# Patient Record
Sex: MALE | Race: OTHER | NOT HISPANIC OR LATINO | URBAN - METROPOLITAN AREA
[De-identification: names, ages, dates, MRNs, and addresses within clinical notes are randomized per-mention and may not be internally consistent; named-entity substitution may affect disease eponyms.]

---

## 2020-11-02 ENCOUNTER — INPATIENT (INPATIENT)
Facility: HOSPITAL | Age: 40
LOS: 2 days | Discharge: HOME | End: 2020-11-05
Attending: HOSPITALIST | Admitting: HOSPITALIST
Payer: MEDICAID

## 2020-11-02 VITALS
DIASTOLIC BLOOD PRESSURE: 72 MMHG | HEART RATE: 80 BPM | SYSTOLIC BLOOD PRESSURE: 126 MMHG | TEMPERATURE: 99 F | RESPIRATION RATE: 17 BRPM | OXYGEN SATURATION: 100 %

## 2020-11-02 LAB
ALBUMIN SERPL ELPH-MCNC: 4.2 G/DL — SIGNIFICANT CHANGE UP (ref 3.5–5.2)
ALP SERPL-CCNC: 117 U/L — HIGH (ref 30–115)
ALT FLD-CCNC: 19 U/L — SIGNIFICANT CHANGE UP (ref 0–41)
ANION GAP SERPL CALC-SCNC: 12 MMOL/L — SIGNIFICANT CHANGE UP (ref 7–14)
AST SERPL-CCNC: 18 U/L — SIGNIFICANT CHANGE UP (ref 0–41)
BASOPHILS # BLD AUTO: 0.03 K/UL — SIGNIFICANT CHANGE UP (ref 0–0.2)
BASOPHILS NFR BLD AUTO: 0.3 % — SIGNIFICANT CHANGE UP (ref 0–1)
BILIRUB SERPL-MCNC: 1.7 MG/DL — HIGH (ref 0.2–1.2)
BUN SERPL-MCNC: 13 MG/DL — SIGNIFICANT CHANGE UP (ref 10–20)
CALCIUM SERPL-MCNC: 9.3 MG/DL — SIGNIFICANT CHANGE UP (ref 8.5–10.1)
CHLORIDE SERPL-SCNC: 103 MMOL/L — SIGNIFICANT CHANGE UP (ref 98–110)
CO2 SERPL-SCNC: 25 MMOL/L — SIGNIFICANT CHANGE UP (ref 17–32)
CREAT SERPL-MCNC: 1.3 MG/DL — SIGNIFICANT CHANGE UP (ref 0.7–1.5)
EOSINOPHIL # BLD AUTO: 0.19 K/UL — SIGNIFICANT CHANGE UP (ref 0–0.7)
EOSINOPHIL NFR BLD AUTO: 1.6 % — SIGNIFICANT CHANGE UP (ref 0–8)
GLUCOSE SERPL-MCNC: 93 MG/DL — SIGNIFICANT CHANGE UP (ref 70–99)
HCT VFR BLD CALC: 40 % — LOW (ref 42–52)
HGB BLD-MCNC: 13.2 G/DL — LOW (ref 14–18)
IMM GRANULOCYTES NFR BLD AUTO: 0.3 % — SIGNIFICANT CHANGE UP (ref 0.1–0.3)
LACTATE SERPL-SCNC: 0.9 MMOL/L — SIGNIFICANT CHANGE UP (ref 0.7–2)
LYMPHOCYTES # BLD AUTO: 1.71 K/UL — SIGNIFICANT CHANGE UP (ref 1.2–3.4)
LYMPHOCYTES # BLD AUTO: 14.5 % — LOW (ref 20.5–51.1)
MCHC RBC-ENTMCNC: 31.1 PG — HIGH (ref 27–31)
MCHC RBC-ENTMCNC: 33 G/DL — SIGNIFICANT CHANGE UP (ref 32–37)
MCV RBC AUTO: 94.3 FL — HIGH (ref 80–94)
MONOCYTES # BLD AUTO: 1.24 K/UL — HIGH (ref 0.1–0.6)
MONOCYTES NFR BLD AUTO: 10.5 % — HIGH (ref 1.7–9.3)
NEUTROPHILS # BLD AUTO: 8.6 K/UL — HIGH (ref 1.4–6.5)
NEUTROPHILS NFR BLD AUTO: 72.8 % — SIGNIFICANT CHANGE UP (ref 42.2–75.2)
NRBC # BLD: 0 /100 WBCS — SIGNIFICANT CHANGE UP (ref 0–0)
PLATELET # BLD AUTO: 385 K/UL — SIGNIFICANT CHANGE UP (ref 130–400)
POTASSIUM SERPL-MCNC: 4.2 MMOL/L — SIGNIFICANT CHANGE UP (ref 3.5–5)
POTASSIUM SERPL-SCNC: 4.2 MMOL/L — SIGNIFICANT CHANGE UP (ref 3.5–5)
PROT SERPL-MCNC: 7.1 G/DL — SIGNIFICANT CHANGE UP (ref 6–8)
RBC # BLD: 4.24 M/UL — LOW (ref 4.7–6.1)
RBC # FLD: 12.3 % — SIGNIFICANT CHANGE UP (ref 11.5–14.5)
SODIUM SERPL-SCNC: 140 MMOL/L — SIGNIFICANT CHANGE UP (ref 135–146)
WBC # BLD: 11.81 K/UL — HIGH (ref 4.8–10.8)
WBC # FLD AUTO: 11.81 K/UL — HIGH (ref 4.8–10.8)

## 2020-11-02 PROCEDURE — 70487 CT MAXILLOFACIAL W/DYE: CPT | Mod: 26

## 2020-11-02 PROCEDURE — 99285 EMERGENCY DEPT VISIT HI MDM: CPT

## 2020-11-02 PROCEDURE — 99223 1ST HOSP IP/OBS HIGH 75: CPT

## 2020-11-02 RX ORDER — AMPICILLIN SODIUM AND SULBACTAM SODIUM 250; 125 MG/ML; MG/ML
3 INJECTION, POWDER, FOR SUSPENSION INTRAMUSCULAR; INTRAVENOUS EVERY 6 HOURS
Refills: 0 | Status: DISCONTINUED | OUTPATIENT
Start: 2020-11-03 | End: 2020-11-05

## 2020-11-02 RX ORDER — ACETAMINOPHEN 500 MG
650 TABLET ORAL EVERY 6 HOURS
Refills: 0 | Status: DISCONTINUED | OUTPATIENT
Start: 2020-11-02 | End: 2020-11-03

## 2020-11-02 RX ORDER — ENOXAPARIN SODIUM 100 MG/ML
40 INJECTION SUBCUTANEOUS DAILY
Refills: 0 | Status: DISCONTINUED | OUTPATIENT
Start: 2020-11-02 | End: 2020-11-05

## 2020-11-02 RX ORDER — AMPICILLIN SODIUM AND SULBACTAM SODIUM 250; 125 MG/ML; MG/ML
INJECTION, POWDER, FOR SUSPENSION INTRAMUSCULAR; INTRAVENOUS
Refills: 0 | Status: DISCONTINUED | OUTPATIENT
Start: 2020-11-02 | End: 2020-11-05

## 2020-11-02 RX ORDER — PREGABALIN 225 MG/1
1000 CAPSULE ORAL DAILY
Refills: 0 | Status: DISCONTINUED | OUTPATIENT
Start: 2020-11-02 | End: 2020-11-05

## 2020-11-02 RX ORDER — INFLUENZA VIRUS VACCINE 15; 15; 15; 15 UG/.5ML; UG/.5ML; UG/.5ML; UG/.5ML
0.5 SUSPENSION INTRAMUSCULAR ONCE
Refills: 0 | Status: DISCONTINUED | OUTPATIENT
Start: 2020-11-02 | End: 2020-11-05

## 2020-11-02 RX ORDER — CHLORHEXIDINE GLUCONATE 213 G/1000ML
1 SOLUTION TOPICAL ONCE
Refills: 0 | Status: COMPLETED | OUTPATIENT
Start: 2020-11-02 | End: 2020-11-02

## 2020-11-02 RX ORDER — AMPICILLIN SODIUM AND SULBACTAM SODIUM 250; 125 MG/ML; MG/ML
3 INJECTION, POWDER, FOR SUSPENSION INTRAMUSCULAR; INTRAVENOUS ONCE
Refills: 0 | Status: COMPLETED | OUTPATIENT
Start: 2020-11-02 | End: 2020-11-02

## 2020-11-02 RX ORDER — KETOROLAC TROMETHAMINE 30 MG/ML
30 SYRINGE (ML) INJECTION ONCE
Refills: 0 | Status: DISCONTINUED | OUTPATIENT
Start: 2020-11-02 | End: 2020-11-02

## 2020-11-02 RX ORDER — SODIUM CHLORIDE 9 MG/ML
1000 INJECTION, SOLUTION INTRAVENOUS ONCE
Refills: 0 | Status: COMPLETED | OUTPATIENT
Start: 2020-11-02 | End: 2020-11-02

## 2020-11-02 RX ORDER — OXYCODONE AND ACETAMINOPHEN 5; 325 MG/1; MG/1
1 TABLET ORAL EVERY 6 HOURS
Refills: 0 | Status: DISCONTINUED | OUTPATIENT
Start: 2020-11-02 | End: 2020-11-05

## 2020-11-02 RX ORDER — PANTOPRAZOLE SODIUM 20 MG/1
40 TABLET, DELAYED RELEASE ORAL
Refills: 0 | Status: DISCONTINUED | OUTPATIENT
Start: 2020-11-02 | End: 2020-11-05

## 2020-11-02 RX ORDER — FOLIC ACID 0.8 MG
1 TABLET ORAL DAILY
Refills: 0 | Status: DISCONTINUED | OUTPATIENT
Start: 2020-11-02 | End: 2020-11-05

## 2020-11-02 RX ADMIN — SODIUM CHLORIDE 1000 MILLILITER(S): 9 INJECTION, SOLUTION INTRAVENOUS at 15:02

## 2020-11-02 RX ADMIN — OXYCODONE AND ACETAMINOPHEN 1 TABLET(S): 5; 325 TABLET ORAL at 22:01

## 2020-11-02 RX ADMIN — Medication 650 MILLIGRAM(S): at 22:00

## 2020-11-02 RX ADMIN — AMPICILLIN SODIUM AND SULBACTAM SODIUM 200 GRAM(S): 250; 125 INJECTION, POWDER, FOR SUSPENSION INTRAMUSCULAR; INTRAVENOUS at 17:32

## 2020-11-02 RX ADMIN — AMPICILLIN SODIUM AND SULBACTAM SODIUM 200 GRAM(S): 250; 125 INJECTION, POWDER, FOR SUSPENSION INTRAMUSCULAR; INTRAVENOUS at 22:13

## 2020-11-02 RX ADMIN — Medication 30 MILLIGRAM(S): at 15:02

## 2020-11-02 NOTE — H&P ADULT - NSHPREVIEWOFSYSTEMS_GEN_ALL_CORE
CONSTITUTIONAL: No weakness, fevers or chills, no weight loss   EYES/ENT: No visual changes;  No vertigo or throat pain   NECK: No pain or stiffness  RESPIRATORY: No cough, wheezing, hemoptysis; No shortness of breath  CARDIOVASCULAR: No chest pain or palpitations  GASTROINTESTINAL: No abdominal or epigastric pain. No nausea, vomiting, or hematemesis; No diarrhea or constipation. No melena or hematochezia.  GENITOURINARY: No dysuria, frequency or hematuria  NEUROLOGICAL: No numbness or weakness  All other review of systems is negative unless indicated above. CONSTITUTIONAL: No weakness, fevers or chills, no weight loss   EYES/ENT: As above  NECK: No pain or stiffness  RESPIRATORY: No cough, wheezing, hemoptysis; No shortness of breath  CARDIOVASCULAR: No chest pain or palpitations  GASTROINTESTINAL: No abdominal or epigastric pain. No nausea, vomiting or hematemesis; No diarrhea or constipation. No melena or hematochezia.  GENITOURINARY: No dysuria, frequency or hematuria  NEUROLOGICAL: No numbness or weakness  All other review of systems is negative unless indicated above.

## 2020-11-02 NOTE — H&P ADULT - ASSESSMENT
40 year old male with no significant past medical history presents with left sided facial pain.    Patient likely has 40 year old male with no significant past medical history presents with left sided facial pain.    Patient likely has a dental abscess from already present dental caries and the trauma from impacted bone during eating. Will continue with Unasyn 3g q6 and follow with dental for further workup.    # Left sided dental Abscess  # H/O of Dental Caries  - Pain, subjective fevers with chills for 2 weeks   - white count 12k, afebrile, hemodynamically stable  - CT shows a 3x2 cm collection possibly an abscess(prelim read)  - s/p oral clindamycin for one week  - Unasyn 3g q6 for now  - Percocet for severe and Tylenol for mild pain  - Follow up with Dental regarding drainage  - Soft Diet for now    DVT: Lovenox  GI: Pantoprazole  Activity: Ambulate as tolerated  Dispo: Acute for now   40 year old male with no significant past medical history presents with left sided facial pain.    Patient likely has a dental abscess from already present dental caries and the trauma from impacted bone during eating. Will continue with Unasyn 3g q6 and follow with dental for further workup.    # Left sided Facial pain secondary to likely dental Abscess  # H/O of Dental Caries  - Pain, subjective fevers with chills for 2 weeks   - white count 12k, afebrile, hemodynamically stable  - CT shows a 3x2 cm collection possibly an abscess(prelim read)  - s/p oral clindamycin for one week  - Unasyn 3g q6 for now  - Percocet for severe and Tylenol for mild pain  - Follow up with Dental regarding drainage  - Soft Diet for now    DVT: Lovenox  GI: Pantoprazole  Activity: Ambulate as tolerated  Dispo: Acute for now   40 year old male with no significant past medical history presents with left sided facial pain.    Patient likely has a dental abscess from already present dental caries and the trauma from impacted bone during eating. Will continue with Unasyn 3g q6 and follow with dental for further workup.    # Left sided Facial pain secondary to likely dental Abscess  # H/O of Dental Caries  - Pain, subjective fevers with chills for 2 weeks   - white count 12k, afebrile, hemodynamically stable  - CT shows a 3x2 cm collection possibly an abscess(prelim read)  - s/p oral clindamycin for one week  - Unasyn 3g q6 for now  - Percocet for severe and Tylenol for mild pain  - Follow up with Dental regarding drainage  - Soft Diet for now    # Alcohol Use  - Counselled on reducing the consumption of alcohol    # Active Smoker  - Refused Nicotine patch  - Counselled on cessation    DVT: Lovenox  GI: Pantoprazole  Activity: Ambulate as tolerated  Dispo: Acute for now   40 year old male with no significant past medical history presents with left sided facial pain.    Patient likely has a dental abscess from already present dental caries and the trauma from impacted bone during eating. Will continue with Unasyn 3g q6 and follow with dental for further workup.    # Left sided Facial pain secondary to likely dental Abscess  # H/O of Dental Caries  - Pain, subjective fevers with chills for 2 weeks   - white count 12k, afebrile, hemodynamically stable  - CT shows a 3x2 cm collection possibly an abscess(prelim read)  - s/p oral clindamycin for one week  - Unasyn 3g q6 for now  - Percocet for severe and Tylenol for mild pain  - Follow up with Dental regarding drainage  - Soft Diet for now    # Alcohol Use  - Counselled on reducing the consumption of alcohol    # Active Smoker  - Refused Nicotine patch  - Counselled on cessation    # Macrocytic Anemia likely alcohol induced  - Will supplement b12 and folate    DVT: Lovenox  GI: Pantoprazole  Activity: Ambulate as tolerated  Dispo: Acute for now

## 2020-11-02 NOTE — ED ADULT TRIAGE NOTE - CHIEF COMPLAINT QUOTE
Pt complaining of fever and swelling to face and neck. was being treated for dental infection that did not resolve. As per note from dentist, infection has spread. sent to ED for IV antibiotics

## 2020-11-02 NOTE — ED PROVIDER NOTE - PHYSICAL EXAMINATION
CONST: Pt appears uncomfortable  EYES: Sclera and conjunctiva clear.   ENT: Oropharynx poor dentition with erythema to L inferior molars. Swelling to L mandible. Uvula midline. No nasal discharge.   NECK: Non-tender, no meningeal signs. normal ROM. supple   CARD: S1 S2; No jvd  RESP: Equal BS B/L, No wheezes, rhonchi or rales. No distress  GI: Soft, non-tender, non-distended. normal BS  MS: Normal ROM in all extremities. pulses 2 +. no calf tenderness or swelling  SKIN: Warm, dry, no acute rashes. Good turgor  NEURO: A&Ox3, No focal deficits. Strength 5/5 with no sensory deficits

## 2020-11-02 NOTE — ED ADULT NURSE NOTE - NSIMPLEMENTINTERV_GEN_ALL_ED
Implemented All Universal Safety Interventions:  [place] to call system. Call bell, personal items and telephone within reach. Instruct patient to call for assistance. Room bathroom lighting operational. Non-slip footwear when patient is off stretcher. Physically safe environment: no spills, clutter or unnecessary equipment. Stretcher in lowest position, wheels locked, appropriate side rails in place.

## 2020-11-02 NOTE — ED PROVIDER NOTE - CLINICAL SUMMARY MEDICAL DECISION MAKING FREE TEXT BOX
41yo M presents for dental pain. Has been on ABx for at least 4 days, no improvement. In ED, no fever, mild leukocytosis, CT showing collection poss abscess. Admitted to IV ABx/ failure of outpatient ABx. Dental to see pt as inpatient. no concerns

## 2020-11-02 NOTE — H&P ADULT - ATTENDING COMMENTS
I saw and evaluated the patient on 11/2/2020. I have reviewed and agree with the findings and plan of care as documented above in the resident’s note (unless indicated differently below). Any necessary changes were made in the body of the text.    CC: facial pain and swelling    HPI:  41 yo M pt w/ no relevant PMHx p/w left sided facial pain and swelling. Onset: 2 weeks ago. Precipitating factor: bone injured teeth and gums as he was eating. Course: persistent pain after this injury. Quality: sharp. Location: left jaw. Radiation: left face/upper neck. Intensity: moderate. Associated symptoms: subjective fevers/chills. Alleviating factors: analgesics; did not improve w/ clindamycin which was prescribed by his dentist. Aggravating factors: none. Coming in now for further evaluation.     ROS:  Constitutional: +subjective fevers and chills  Eyes: no conjunctivitis; no itching  ENT: +dysphagia; +left sided facial pain and swelling  CVS: no orthopnea; no chest pain  Resp: no SOB; no coughing  GI: no nausea; no vomiting; no diarrhea; no abd pain  : no dysuria; no hematuria  MSK: no myalgias; no arthralgias  Skin: no rashes; no ulcers  Neuro: no focal weakness; no headache  All other systems reviewed and are negative    PMHx, home medications, SurHx, FHx and Social history as above in the corresponding sections of the note - reviewed and edited where appropriate    Exam:  Vitals: BP = 162/87; P = 60; T = 99.6; RR = 18; SpO2 > 95 on room air  General: appears stated age; cooperative  Eyes: anicteric sclera; moist conjunctiva; PERRL; EOMI  HENT: NC/AT; +trismus; clear oropharynx; MMM; +left sided facial and neck swelling; +left submandibular collection; nL hard/soft palate  Neck: supple w/ FROM; trachea midline; no thyromegaly; no carotid bruits  Lungs: cta b/l with no tachypnea, accessory muscle usage, wheezing, rhonci or rales  CVS: RRR; S1 and S2 w/o MRGs  Abd: BS+; soft; non-tender to palpation x 4; no masses or HSM  Ext: no peripheral edema; pulses 2+ b/l  Skin: normal temp, turgor and texture; no rashes, ulcers or nodules  Neuro: CN II-XII intact; str 5/5 throughout; sensation grossly intact – light touch/temp  Psych: appropriate affect; alert and oriented to person, place, time and situation    Labs reviewed - significant for WBC 11.81, BUN/Cr 13/1.3, Tbili 1.7, alk phos 117  Imaging reviewed - left submandibular collection 3.4 x 2.2 cm w/ adjacent lymphadenopathy, platysmal thickening & fat stranding; impacted left molar  EKG requested    Assessment:  (1) Left submandibular soft tissue infection:  --- Likely odonotogenic & w/ a possible developing abscess  (2) Possible mild acute renal failure  (3) Active tobacco use - cessation strongly advised  (4) Mildly abnormal LFT's possibly Gilbert's syndrome  (5) Borderline macrocytosis on blood work - r/o B12, folate deficiency    Plan:  (1) Pain control  (2) Dental evaluation: possible drainage?  (3) Abx: agree w/ amp-sulbactam 3g q6hrly  (4) Smoking cessation strongly advised  (5) Daily BUN/Cr; check U/A  (6) Can repeat LFT's; check B12 and folate  (7) Dispo: anticipate d/c in 48 hrs  --- Once evidence of clinical improvement, can switch to amox-clav 875 bid  --- To complete 7-14 days of abx therapy  --- Can d/c plan at that time on PO abx w/ close out-pt f/u    Full code

## 2020-11-02 NOTE — ED PROVIDER NOTE - ATTENDING CONTRIBUTION TO CARE
39yo M with no sig PMHx presents for eval of dental pain. Pt states 2 weeks ago when a bone injured his tooth/gums when eating. Saw dentist 1 week ago, started on ABx, however pain continuing to worsen with swelling in the area, radiates to side of face, worse with talking or opening jaw. Sent in by dentist Dr. Martinez today for further eval. Denies fever/chills, headache, lightheadedness, visual changes, dysphagia, voice changes/ hoarseness, CP, SOB, cough, nausea, vomiting, abd pain.    Vital signs reviewed  GENERAL: Patient nontoxic appearing, NAD  HEAD: NCAT  EYES: Anicteric  ENT: MMM. Gross decay tooth #18. Gingival erythema. Left facial swelling. No submandibular swelling.  RESPIRATORY: Normal respiratory effort. CTA B/L. No wheezing, rales, rhonchi  CARDIOVASCULAR: Regular rate and rhythm  ABDOMEN: Soft. Nondistended. Nontender.   MUSCULOSKELETAL/EXTREMITIES: Brisk cap refill. Equal radial pulses.   SKIN:  Warm and dry  NEURO: AAOx3. No gross FND.

## 2020-11-02 NOTE — H&P ADULT - NSHPPHYSICALEXAM_GEN_ALL_CORE
GENERAL: NAD, lying in bed comfortably  Face:  CHEST/LUNG: Clear to auscultation bilaterally; No rales, rhonchi, wheezing, or rubs. Unlabored respirations  HEART: Regular rate and rhythm;  ABDOMEN: Bowel sounds present; Soft, Nontender, Nondistended. No hepatomegaly  EXTREMITIES:  No peripheral edema  NERVOUS SYSTEM:  Alert & Oriented X3, speech clear. No deficits   MSK: FROM all 4 extremities, full and equal strength GENERAL: NAD, lying in bed comfortably  Face: Left side maxillofacial angle swelling and pain.  CHEST/LUNG: Clear to auscultation bilaterally; No rales, rhonchi, wheezing, or rubs. Unlabored respirations  HEART: Regular rate and rhythm;  ABDOMEN: Bowel sounds present; Soft, Nontender, Nondistended. No hepatomegaly  EXTREMITIES:  No peripheral edema  NERVOUS SYSTEM:  Alert & Oriented X3, speech clear. No deficits   MSK: FROM all 4 extremities, full and equal strength

## 2020-11-02 NOTE — H&P ADULT - NSHPLABSRESULTS_GEN_ALL_CORE
13.2   11.81 )-----------( 385      ( 02 Nov 2020 15:24 )             40.0       11-02    140  |  103  |  13  ----------------------------<  93  4.2   |  25  |  1.3    Ca    9.3      02 Nov 2020 15:24    TPro  7.1  /  Alb  4.2  /  TBili  1.7<H>  /  DBili  x   /  AST  18  /  ALT  19  /  AlkPhos  117<H>  11-02                            CAPILLARY BLOOD GLUCOSE

## 2020-11-02 NOTE — H&P ADULT - HISTORY OF PRESENT ILLNESS
40 year old male with no significant past medical history presents with left sided facial pain.    Patient reports that he was in usual state of health 2 weeks ago when he developed left sided dental pain. Patient was prescribed clindamycin with little intial improvement but worsened over time again. Patient presented to ED due to non improvement in symptoms.    In ED patient hemodynamically stable, afebrile, elevated white count of 11.81, CT scan showing 3.4x2.2cm collection in the maxillofacial area. Dental consulted and will follow on the patient. 40 year old male with no significant past medical history presents with left sided facial pain.    Patient reports that he was in usual state of health 2 weeks ago when he developed left sided dental pain sharp after a bone injured his teeth and gum while eating. Patient had occasional intermittent bleeding and left sided dental pain. Patient reports associated subjective fevers and chills. He saw his dentist and was prescribed clindamycin one week ago with little initial improvement but worsened over time again. He reports sharp 7/10 pain in the left jaw with radiation to left side of face worse with movement of jaw including eating, talking or opening mouth, slightly improved with rest. Patient presented to ED due to non improvement in symptoms despite treatment.    In ED patient hemodynamically stable, afebrile, elevated white count of 11.81, CT scan showing 3.4x2.2cm collection in the maxillofacial area. Dental consulted and will follow on the patient. 40 year old male with no significant past medical history presents with left sided facial pain.    Patient reports that he was in usual state of health 2 weeks ago when he developed left sided dental pain sharp after a bone injured his teeth and gum while eating. Patient had occasional intermittent bleeding and left sided dental pain. Patient reports associated subjective fevers and chills. He saw his dentist and was prescribed clindamycin one week ago with little initial improvement but worsened over time again. He reports sharp 7/10 pain in the left jaw with radiation to left side of face worse with movement of jaw including eating, talking or opening mouth, slightly improved with rest. Patient presented to ED due to non improvement in symptoms despite treatment.    In ED patient hemodynamically stable, afebrile, elevated white count of 11.81, CT scan showing 3.4x2.2cm collection in the maxillofacial area. Dental consulted and will follow on the patient. Unasyn one dose given.

## 2020-11-02 NOTE — ED ADULT NURSE NOTE - OBJECTIVE STATEMENT
left side facial pain and swelling for few days. Pt being tx with Abx for dental pain and swelling, states it is getting worse

## 2020-11-02 NOTE — ED PROVIDER NOTE - OBJECTIVE STATEMENT
40y M no ppmh presents for eval of dental pain. Pt has moderate/severe aching L inferior dental pain x2wks, no improvement with clindamycin, aggravated by eating. Associated swelling and fevers. Denies ha, cp, sob, weakness, numbness, n/v/d/c

## 2020-11-02 NOTE — ED PROVIDER NOTE - NS ED ROS FT
Constitutional: (-) fever  Eyes/ENT: (+) dental pain, (-) blurry vision, (-) epistaxis  Cardiovascular: (-) chest pain, (-) syncope  Respiratory: (-) cough, (-) shortness of breath  Gastrointestinal: (-) vomiting, (-) diarrhea  : (-) dysuria, (-) hematuria  Musculoskeletal: (-) neck pain, (-) back pain, (-) joint pain  Integumentary: (+) edema, (-) rash  Neurological: (-) headache, (-) altered mental status  Allergic/Immunologic: (-) pruritus

## 2020-11-03 LAB
A1C WITH ESTIMATED AVERAGE GLUCOSE RESULT: 5.4 % — SIGNIFICANT CHANGE UP (ref 4–5.6)
ALBUMIN SERPL ELPH-MCNC: 3.8 G/DL — SIGNIFICANT CHANGE UP (ref 3.5–5.2)
ALP SERPL-CCNC: 107 U/L — SIGNIFICANT CHANGE UP (ref 30–115)
ALT FLD-CCNC: 14 U/L — SIGNIFICANT CHANGE UP (ref 0–41)
ANION GAP SERPL CALC-SCNC: 13 MMOL/L — SIGNIFICANT CHANGE UP (ref 7–14)
APTT BLD: 33.6 SEC — SIGNIFICANT CHANGE UP (ref 27–39.2)
AST SERPL-CCNC: 14 U/L — SIGNIFICANT CHANGE UP (ref 0–41)
BASOPHILS # BLD AUTO: 0.06 K/UL — SIGNIFICANT CHANGE UP (ref 0–0.2)
BASOPHILS NFR BLD AUTO: 0.5 % — SIGNIFICANT CHANGE UP (ref 0–1)
BILIRUB SERPL-MCNC: 1.7 MG/DL — HIGH (ref 0.2–1.2)
BUN SERPL-MCNC: 13 MG/DL — SIGNIFICANT CHANGE UP (ref 10–20)
CALCIUM SERPL-MCNC: 8.6 MG/DL — SIGNIFICANT CHANGE UP (ref 8.5–10.1)
CHLORIDE SERPL-SCNC: 104 MMOL/L — SIGNIFICANT CHANGE UP (ref 98–110)
CHOLEST SERPL-MCNC: 242 MG/DL — HIGH
CO2 SERPL-SCNC: 23 MMOL/L — SIGNIFICANT CHANGE UP (ref 17–32)
CREAT SERPL-MCNC: 1 MG/DL — SIGNIFICANT CHANGE UP (ref 0.7–1.5)
EOSINOPHIL # BLD AUTO: 0.2 K/UL — SIGNIFICANT CHANGE UP (ref 0–0.7)
EOSINOPHIL NFR BLD AUTO: 1.5 % — SIGNIFICANT CHANGE UP (ref 0–8)
ESTIMATED AVERAGE GLUCOSE: 108 MG/DL — SIGNIFICANT CHANGE UP (ref 68–114)
GLUCOSE SERPL-MCNC: 80 MG/DL — SIGNIFICANT CHANGE UP (ref 70–99)
HCT VFR BLD CALC: 39.7 % — LOW (ref 42–52)
HDLC SERPL-MCNC: 35 MG/DL — LOW
HGB BLD-MCNC: 13.1 G/DL — LOW (ref 14–18)
IMM GRANULOCYTES NFR BLD AUTO: 0.5 % — HIGH (ref 0.1–0.3)
INR BLD: 1.31 RATIO — HIGH (ref 0.65–1.3)
LIPID PNL WITH DIRECT LDL SERPL: 177 MG/DL — HIGH
LYMPHOCYTES # BLD AUTO: 1.59 K/UL — SIGNIFICANT CHANGE UP (ref 1.2–3.4)
LYMPHOCYTES # BLD AUTO: 12.2 % — LOW (ref 20.5–51.1)
MAGNESIUM SERPL-MCNC: 2.1 MG/DL — SIGNIFICANT CHANGE UP (ref 1.8–2.4)
MCHC RBC-ENTMCNC: 31.1 PG — HIGH (ref 27–31)
MCHC RBC-ENTMCNC: 33 G/DL — SIGNIFICANT CHANGE UP (ref 32–37)
MCV RBC AUTO: 94.3 FL — HIGH (ref 80–94)
MONOCYTES # BLD AUTO: 1.16 K/UL — HIGH (ref 0.1–0.6)
MONOCYTES NFR BLD AUTO: 8.9 % — SIGNIFICANT CHANGE UP (ref 1.7–9.3)
NEUTROPHILS # BLD AUTO: 9.97 K/UL — HIGH (ref 1.4–6.5)
NEUTROPHILS NFR BLD AUTO: 76.4 % — HIGH (ref 42.2–75.2)
NON HDL CHOLESTEROL: 207 MG/DL — HIGH
NRBC # BLD: 0 /100 WBCS — SIGNIFICANT CHANGE UP (ref 0–0)
PHOSPHATE SERPL-MCNC: 3.7 MG/DL — SIGNIFICANT CHANGE UP (ref 2.1–4.9)
PLATELET # BLD AUTO: 375 K/UL — SIGNIFICANT CHANGE UP (ref 130–400)
POTASSIUM SERPL-MCNC: 3.9 MMOL/L — SIGNIFICANT CHANGE UP (ref 3.5–5)
POTASSIUM SERPL-SCNC: 3.9 MMOL/L — SIGNIFICANT CHANGE UP (ref 3.5–5)
PROT SERPL-MCNC: 6.5 G/DL — SIGNIFICANT CHANGE UP (ref 6–8)
PROTHROM AB SERPL-ACNC: 15.1 SEC — HIGH (ref 9.95–12.87)
RBC # BLD: 4.21 M/UL — LOW (ref 4.7–6.1)
RBC # FLD: 12.1 % — SIGNIFICANT CHANGE UP (ref 11.5–14.5)
SARS-COV-2 RNA SPEC QL NAA+PROBE: SIGNIFICANT CHANGE UP
SODIUM SERPL-SCNC: 140 MMOL/L — SIGNIFICANT CHANGE UP (ref 135–146)
TRIGL SERPL-MCNC: 139 MG/DL — SIGNIFICANT CHANGE UP
WBC # BLD: 13.05 K/UL — HIGH (ref 4.8–10.8)
WBC # FLD AUTO: 13.05 K/UL — HIGH (ref 4.8–10.8)

## 2020-11-03 PROCEDURE — 99232 SBSQ HOSP IP/OBS MODERATE 35: CPT

## 2020-11-03 RX ORDER — OXYCODONE AND ACETAMINOPHEN 5; 325 MG/1; MG/1
1 TABLET ORAL ONCE
Refills: 0 | Status: DISCONTINUED | OUTPATIENT
Start: 2020-11-03 | End: 2020-11-03

## 2020-11-03 RX ADMIN — PANTOPRAZOLE SODIUM 40 MILLIGRAM(S): 20 TABLET, DELAYED RELEASE ORAL at 06:32

## 2020-11-03 RX ADMIN — ENOXAPARIN SODIUM 40 MILLIGRAM(S): 100 INJECTION SUBCUTANEOUS at 11:16

## 2020-11-03 RX ADMIN — OXYCODONE AND ACETAMINOPHEN 1 TABLET(S): 5; 325 TABLET ORAL at 21:42

## 2020-11-03 RX ADMIN — Medication 1 MILLIGRAM(S): at 11:16

## 2020-11-03 RX ADMIN — AMPICILLIN SODIUM AND SULBACTAM SODIUM 200 GRAM(S): 250; 125 INJECTION, POWDER, FOR SUSPENSION INTRAMUSCULAR; INTRAVENOUS at 11:15

## 2020-11-03 RX ADMIN — OXYCODONE AND ACETAMINOPHEN 1 TABLET(S): 5; 325 TABLET ORAL at 01:42

## 2020-11-03 RX ADMIN — AMPICILLIN SODIUM AND SULBACTAM SODIUM 200 GRAM(S): 250; 125 INJECTION, POWDER, FOR SUSPENSION INTRAMUSCULAR; INTRAVENOUS at 18:22

## 2020-11-03 RX ADMIN — OXYCODONE AND ACETAMINOPHEN 1 TABLET(S): 5; 325 TABLET ORAL at 02:23

## 2020-11-03 RX ADMIN — AMPICILLIN SODIUM AND SULBACTAM SODIUM 200 GRAM(S): 250; 125 INJECTION, POWDER, FOR SUSPENSION INTRAMUSCULAR; INTRAVENOUS at 05:20

## 2020-11-03 RX ADMIN — OXYCODONE AND ACETAMINOPHEN 1 TABLET(S): 5; 325 TABLET ORAL at 00:06

## 2020-11-03 RX ADMIN — Medication 650 MILLIGRAM(S): at 00:06

## 2020-11-03 RX ADMIN — PREGABALIN 1000 MICROGRAM(S): 225 CAPSULE ORAL at 11:16

## 2020-11-03 NOTE — PROGRESS NOTE ADULT - SUBJECTIVE AND OBJECTIVE BOX
SUBJECTIVE:    Patient is a 40y old Male who presents with a chief complaint of Facial Pain (02 Nov 2020 20:22)      HPI:  40 year old male with no significant past medical history presents with left sided facial pain.    Patient reports that he was in usual state of health 2 weeks ago when he developed left sided dental pain sharp after a bone injured his teeth and gum while eating. Patient had occasional intermittent bleeding and left sided dental pain. Patient reports associated subjective fevers and chills. He saw his dentist and was prescribed clindamycin one week ago with little initial improvement but worsened over time again. He reports sharp 7/10 pain in the left jaw with radiation to left side of face worse with movement of jaw including eating, talking or opening mouth, slightly improved with rest. Patient presented to ED due to non improvement in symptoms despite treatment.    In ED patient hemodynamically stable, afebrile, elevated white count of 11.81, CT scan showing 3.4x2.2cm collection in the maxillofacial area. Dental consulted and will follow on the patient. Unasyn one dose given. (02 Nov 2020 20:22)      Currently admitted to medicine with the primary diagnosis of Dental infection    left sided facial pain, no increase in the pain or spreading, pain with swallowing    Besides the pertinent positives and negatives described above, the ROS was within normal limits.    PAST MEDICAL & SURGICAL HISTORY  No pertinent past medical history    No significant past surgical history      SOCIAL HISTORY:    ALLERGIES:  Grass, Dogs, Animals, Pollen (Pruritus)  No Known Drug Allergies    MEDICATIONS:  STANDING MEDICATIONS  ampicillin/sulbactam  IVPB      ampicillin/sulbactam  IVPB 3 Gram(s) IV Intermittent every 6 hours  chlorhexidine 4% Liquid 1 Application(s) Topical once  cyanocobalamin 1000 MICROGram(s) Oral daily  enoxaparin Injectable 40 milliGRAM(s) SubCutaneous daily  folic acid 1 milliGRAM(s) Oral daily  influenza   Vaccine 0.5 milliLiter(s) IntraMuscular once  pantoprazole    Tablet 40 milliGRAM(s) Oral before breakfast    PRN MEDICATIONS  acetaminophen   Tablet .. 650 milliGRAM(s) Oral every 6 hours PRN  oxycodone    5 mG/acetaminophen 325 mG 1 Tablet(s) Oral every 6 hours PRN    VITALS:   T(F): 98.3  HR: 74  BP: 131/86  RR: 18  SpO2: 99%    LABS:                        13.1   13.05 )-----------( 375      ( 03 Nov 2020 07:24 )             39.7     11-03    140  |  104  |  13  ----------------------------<  80  3.9   |  23  |  1.0    Ca    8.6      03 Nov 2020 07:24  Phos  3.7     11-03  Mg     2.1     11-03    TPro  6.5  /  Alb  3.8  /  TBili  1.7<H>  /  DBili  x   /  AST  14  /  ALT  14  /  AlkPhos  107  11-03    PT/INR - ( 03 Nov 2020 07:24 )   PT: 15.10 sec;   INR: 1.31 ratio         PTT - ( 03 Nov 2020 07:24 )  PTT:33.6 sec      Lactate, Blood: 0.9 mmol/L (11-02-20 @ 15:24)          RADIOLOGY:    PHYSICAL EXAM:  GEN: No acute distress  LUNGS: Clear to auscultation bilaterally   HEAD: tenderness in left cheek, submental tenderness left side  HEART: Regular  ABD: Soft, non-tender, non-distended.  EXT: NC/NC/NE/2+PP/WOLF/Skin Intact.   NEURO: AAOX3    Intravenous access: yes  NG tube: no   Brunner Catheter: no

## 2020-11-03 NOTE — PROGRESS NOTE ADULT - ASSESSMENT
40 year old male with no significant past medical history presents with left sided facial pain secondary to dental abscess    # Left sided Facial pain secondary to dental Abscess  - wbc 13, currently afebrile, 100.6 last night  - CT shows a 3x2 cm collection  - Unasyn 3g q6 // f/u dental consult  - Percocet for severe pain  - Soft Diet for now    # Alcohol Use  - Counselled on reducing the consumption of alcohol    # Active Smoker  - Refused Nicotine patch  - Counselled on cessation    # Macrocytic Anemia likely alcohol induced  - Will supplement b12 and folate    PLAN: f/u dental, c/w abx    DVT: Lovenox  GI: Pantoprazole  Activity: Ambulate as tolerated  Dispo: Acute for now

## 2020-11-04 LAB
ALBUMIN SERPL ELPH-MCNC: 3.5 G/DL — SIGNIFICANT CHANGE UP (ref 3.5–5.2)
ALP SERPL-CCNC: 97 U/L — SIGNIFICANT CHANGE UP (ref 30–115)
ALT FLD-CCNC: 13 U/L — SIGNIFICANT CHANGE UP (ref 0–41)
ANION GAP SERPL CALC-SCNC: 11 MMOL/L — SIGNIFICANT CHANGE UP (ref 7–14)
AST SERPL-CCNC: 13 U/L — SIGNIFICANT CHANGE UP (ref 0–41)
BASOPHILS # BLD AUTO: 0.05 K/UL — SIGNIFICANT CHANGE UP (ref 0–0.2)
BASOPHILS NFR BLD AUTO: 0.7 % — SIGNIFICANT CHANGE UP (ref 0–1)
BILIRUB SERPL-MCNC: 1.1 MG/DL — SIGNIFICANT CHANGE UP (ref 0.2–1.2)
BUN SERPL-MCNC: 14 MG/DL — SIGNIFICANT CHANGE UP (ref 10–20)
CALCIUM SERPL-MCNC: 8.8 MG/DL — SIGNIFICANT CHANGE UP (ref 8.5–10.1)
CHLORIDE SERPL-SCNC: 102 MMOL/L — SIGNIFICANT CHANGE UP (ref 98–110)
CO2 SERPL-SCNC: 27 MMOL/L — SIGNIFICANT CHANGE UP (ref 17–32)
CREAT SERPL-MCNC: 1 MG/DL — SIGNIFICANT CHANGE UP (ref 0.7–1.5)
EOSINOPHIL # BLD AUTO: 0.36 K/UL — SIGNIFICANT CHANGE UP (ref 0–0.7)
EOSINOPHIL NFR BLD AUTO: 5.1 % — SIGNIFICANT CHANGE UP (ref 0–8)
GLUCOSE SERPL-MCNC: 81 MG/DL — SIGNIFICANT CHANGE UP (ref 70–99)
HCT VFR BLD CALC: 39.5 % — LOW (ref 42–52)
HGB BLD-MCNC: 13.1 G/DL — LOW (ref 14–18)
IMM GRANULOCYTES NFR BLD AUTO: 0.4 % — HIGH (ref 0.1–0.3)
LYMPHOCYTES # BLD AUTO: 1.9 K/UL — SIGNIFICANT CHANGE UP (ref 1.2–3.4)
LYMPHOCYTES # BLD AUTO: 27.1 % — SIGNIFICANT CHANGE UP (ref 20.5–51.1)
MAGNESIUM SERPL-MCNC: 2.2 MG/DL — SIGNIFICANT CHANGE UP (ref 1.8–2.4)
MCHC RBC-ENTMCNC: 31 PG — SIGNIFICANT CHANGE UP (ref 27–31)
MCHC RBC-ENTMCNC: 33.2 G/DL — SIGNIFICANT CHANGE UP (ref 32–37)
MCV RBC AUTO: 93.4 FL — SIGNIFICANT CHANGE UP (ref 80–94)
MONOCYTES # BLD AUTO: 0.73 K/UL — HIGH (ref 0.1–0.6)
MONOCYTES NFR BLD AUTO: 10.4 % — HIGH (ref 1.7–9.3)
NEUTROPHILS # BLD AUTO: 3.95 K/UL — SIGNIFICANT CHANGE UP (ref 1.4–6.5)
NEUTROPHILS NFR BLD AUTO: 56.3 % — SIGNIFICANT CHANGE UP (ref 42.2–75.2)
NRBC # BLD: 0 /100 WBCS — SIGNIFICANT CHANGE UP (ref 0–0)
PLATELET # BLD AUTO: 421 K/UL — HIGH (ref 130–400)
POTASSIUM SERPL-MCNC: 4.7 MMOL/L — SIGNIFICANT CHANGE UP (ref 3.5–5)
POTASSIUM SERPL-SCNC: 4.7 MMOL/L — SIGNIFICANT CHANGE UP (ref 3.5–5)
PROT SERPL-MCNC: 6.2 G/DL — SIGNIFICANT CHANGE UP (ref 6–8)
RBC # BLD: 4.23 M/UL — LOW (ref 4.7–6.1)
RBC # FLD: 12 % — SIGNIFICANT CHANGE UP (ref 11.5–14.5)
SARS-COV-2 IGG SERPL QL IA: NEGATIVE — SIGNIFICANT CHANGE UP
SARS-COV-2 IGM SERPL IA-ACNC: 0.27 INDEX — SIGNIFICANT CHANGE UP
SODIUM SERPL-SCNC: 140 MMOL/L — SIGNIFICANT CHANGE UP (ref 135–146)
WBC # BLD: 7.02 K/UL — SIGNIFICANT CHANGE UP (ref 4.8–10.8)
WBC # FLD AUTO: 7.02 K/UL — SIGNIFICANT CHANGE UP (ref 4.8–10.8)

## 2020-11-04 PROCEDURE — 99232 SBSQ HOSP IP/OBS MODERATE 35: CPT

## 2020-11-04 RX ORDER — KETOROLAC TROMETHAMINE 30 MG/ML
30 SYRINGE (ML) INJECTION ONCE
Refills: 0 | Status: DISCONTINUED | OUTPATIENT
Start: 2020-11-04 | End: 2020-11-04

## 2020-11-04 RX ADMIN — AMPICILLIN SODIUM AND SULBACTAM SODIUM 200 GRAM(S): 250; 125 INJECTION, POWDER, FOR SUSPENSION INTRAMUSCULAR; INTRAVENOUS at 17:00

## 2020-11-04 RX ADMIN — PREGABALIN 1000 MICROGRAM(S): 225 CAPSULE ORAL at 11:54

## 2020-11-04 RX ADMIN — AMPICILLIN SODIUM AND SULBACTAM SODIUM 200 GRAM(S): 250; 125 INJECTION, POWDER, FOR SUSPENSION INTRAMUSCULAR; INTRAVENOUS at 00:26

## 2020-11-04 RX ADMIN — OXYCODONE AND ACETAMINOPHEN 1 TABLET(S): 5; 325 TABLET ORAL at 21:25

## 2020-11-04 RX ADMIN — AMPICILLIN SODIUM AND SULBACTAM SODIUM 200 GRAM(S): 250; 125 INJECTION, POWDER, FOR SUSPENSION INTRAMUSCULAR; INTRAVENOUS at 12:40

## 2020-11-04 RX ADMIN — AMPICILLIN SODIUM AND SULBACTAM SODIUM 200 GRAM(S): 250; 125 INJECTION, POWDER, FOR SUSPENSION INTRAMUSCULAR; INTRAVENOUS at 05:45

## 2020-11-04 RX ADMIN — OXYCODONE AND ACETAMINOPHEN 1 TABLET(S): 5; 325 TABLET ORAL at 23:39

## 2020-11-04 RX ADMIN — OXYCODONE AND ACETAMINOPHEN 1 TABLET(S): 5; 325 TABLET ORAL at 12:40

## 2020-11-04 RX ADMIN — PANTOPRAZOLE SODIUM 40 MILLIGRAM(S): 20 TABLET, DELAYED RELEASE ORAL at 05:46

## 2020-11-04 RX ADMIN — Medication 30 MILLIGRAM(S): at 12:40

## 2020-11-04 RX ADMIN — AMPICILLIN SODIUM AND SULBACTAM SODIUM 200 GRAM(S): 250; 125 INJECTION, POWDER, FOR SUSPENSION INTRAMUSCULAR; INTRAVENOUS at 23:41

## 2020-11-04 RX ADMIN — OXYCODONE AND ACETAMINOPHEN 1 TABLET(S): 5; 325 TABLET ORAL at 11:54

## 2020-11-04 RX ADMIN — Medication 1 MILLIGRAM(S): at 11:54

## 2020-11-04 RX ADMIN — Medication 30 MILLIGRAM(S): at 11:54

## 2020-11-04 NOTE — PROGRESS NOTE ADULT - ATTENDING COMMENTS
Patient seen and examined s/p tooth extraction by dental. Reports severe pain, no other complaint. Start Toradol for pain. C/w IV abx for now, plan to transition to PO in AM and dc in AM.   Rest of plan as above. Exceptions:  #Hyperlipidemia - lifestyle modifications, smoking and alcohol cessation, ASCVD risk 9.2%, plan to repeat lipid profile as outpatient     #Progress Note Handoff  Pending (specify):  IV abx, pain control   Family discussion: sari pt plan as above   Disposition: Home

## 2020-11-04 NOTE — PROGRESS NOTE ADULT - SUBJECTIVE AND OBJECTIVE BOX
DAILY PROGRESS NOTE  ===========================================================    Patient Information:  RONNY WILLINGHAM  /  40y  /  Male  /  MRN#: [id number]    Hospital Day: 2d     |:::::::::::::::::::::::::::| SUBJECTIVE |:::::::::::::::::::::::::::|    OVERNIGHT EVENTS: None  TODAY: Patient was seen today at bedside. Complains of pain in the jaw. Review of systems is otherwise negative.    |:::::::::::::::::::::::::::| OBJECTIVE |:::::::::::::::::::::::::::|    VITAL SIGNS: Last 24 Hours  T(C): 36.7 (04 Nov 2020 14:36), Max: 37.5 (03 Nov 2020 18:27)  T(F): 98 (04 Nov 2020 14:36), Max: 99.5 (03 Nov 2020 18:27)  HR: 66 (04 Nov 2020 14:36) (60 - 70)  BP: 138/92 (04 Nov 2020 14:36) (113/77 - 138/92)  BP(mean): 89 (03 Nov 2020 22:24) (89 - 89)  RR: 19 (04 Nov 2020 14:36) (18 - 19)  SpO2: 99% (03 Nov 2020 22:24) (99% - 99%)    11-03-20 @ 07:01  -  11-04-20 @ 07:00  --------------------------------------------------------  IN: 772 mL / OUT: 2000 mL / NET: -1228 mL      PHYSICAL EXAM:  GENERAL:   Awake, alert; NAD.  HEENT:  swollen jaw on the left,  Conjunctivae pink, Sclera anicteric; Oral mucosa moist.  CARDIO:   Regular rate; Regular rhythm; S1 & S2.  RESP:   No rales, wheezing, or rhonchi appreciated.  GI:   Soft; NT/ND; BS; No guarding; No rebound tenderness.  EXT:   Stregnth UE 5/5; Strength LE 5/5; No edema.   SKIN:   Intact.    LAB RESULTS:                        13.1   7.02  )-----------( 421      ( 04 Nov 2020 05:18 )             39.5     11-04    140  |  102  |  14  ----------------------------<  81  4.7   |  27  |  1.0    Ca    8.8      04 Nov 2020 05:18  Phos  3.7     11-03  Mg     2.2     11-04    TPro  6.2  /  Alb  3.5  /  TBili  1.1  /  DBili  x   /  AST  13  /  ALT  13  /  AlkPhos  97  11-04    PT/INR - ( 03 Nov 2020 07:24 )   PT: 15.10 sec;   INR: 1.31 ratio         PTT - ( 03 Nov 2020 07:24 )  PTT:33.6 sec            MICROBIOLOGY:    Culture - Blood (collected 02 Nov 2020 15:24)  Source: .Blood Blood  Preliminary Report (03 Nov 2020 22:01):    No growth to date.    Culture - Blood (collected 02 Nov 2020 15:24)  Source: .Blood Blood  Preliminary Report (03 Nov 2020 22:01):    No growth to date.      RADIOLOGY:    ALLERGIES:  Grass, Dogs, Animals, Pollen (Pruritus)  No Known Drug Allergies      ===========================================================

## 2020-11-04 NOTE — CONSULT NOTE ADULT - SUBJECTIVE AND OBJECTIVE BOX
Patient is a 40y old  Male who presents with a chief complaint of Facial Pain (03 Nov 2020 11:54)      HPI:  40 year old male with no significant past medical history presents with left sided facial pain.    Patient reports that he was in usual state of health 2 weeks ago when he developed left sided dental pain sharp after a bone injured his teeth and gum while eating. Patient had occasional intermittent bleeding and left sided dental pain. Patient reports associated subjective fevers and chills. He saw his dentist and was prescribed clindamycin one week ago with little initial improvement but worsened over time again. He reports sharp 7/10 pain in the left jaw with radiation to left side of face worse with movement of jaw including eating, talking or opening mouth, slightly improved with rest. Patient presented to ED due to non improvement in symptoms despite treatment.    In ED patient hemodynamically stable, afebrile, elevated white count of 11.81, CT scan showing 3.4x2.2cm collection in the maxillofacial area. Dental consulted and will follow on the patient. Unasyn one dose given. (02 Nov 2020 20:22)      PAST MEDICAL & SURGICAL HISTORY:  No pertinent past medical history        MEDICATIONS  (STANDING):  ampicillin/sulbactam  IVPB      ampicillin/sulbactam  IVPB 3 Gram(s) IV Intermittent every 6 hours  cyanocobalamin 1000 MICROGram(s) Oral daily  enoxaparin Injectable 40 milliGRAM(s) SubCutaneous daily  folic acid 1 milliGRAM(s) Oral daily  influenza   Vaccine 0.5 milliLiter(s) IntraMuscular once  pantoprazole    Tablet 40 milliGRAM(s) Oral before breakfast    MEDICATIONS  (PRN):  oxycodone    5 mG/acetaminophen 325 mG 1 Tablet(s) Oral every 6 hours PRN Severe Pain (7 - 10)      Allergies    Grass, Dogs, Animals, Pollen (Pruritus)  No Known Drug Allergies          FAMILY HISTORY:  No pertinent family history in first degree relatives      Vital Signs Last 24 Hrs  T(C): 36.3 (04 Nov 2020 04:53), Max: 37.8 (03 Nov 2020 13:35)  T(F): 97.3 (04 Nov 2020 04:53), Max: 100 (03 Nov 2020 13:35)  HR: 60 (04 Nov 2020 04:53) (60 - 74)  BP: 124/88 (04 Nov 2020 04:53) (111/76 - 129/84)  BP(mean): 89 (03 Nov 2020 22:24) (89 - 90)  RR: 18 (04 Nov 2020 04:53) (18 - 18)  SpO2: 99% (03 Nov 2020 22:24) (98% - 99%)    LABS:                        13.1   7.02  )-----------( 421      ( 04 Nov 2020 05:18 )             39.5     11-04    140  |  102  |  14  ----------------------------<  81  4.7   |  27  |  1.0    Ca    8.8      04 Nov 2020 05:18  Phos  3.7     11-03  Mg     2.2     11-04    TPro  6.2  /  Alb  3.5  /  TBili  1.1  /  DBili  x   /  AST  13  /  ALT  13  /  AlkPhos  97  11-04    WBC Count: 7.02 K/uL [4.80 - 10.80] (11-04 @ 05:18)  Platelet Count - Automated: 421 K/uL <H> [130 - 400] (11-04 @ 05:18)  WBC Count: 13.05 K/uL <H> [4.80 - 10.80] (11-03 @ 07:24)  Platelet Count - Automated: 375 K/uL [130 - 400] (11-03 @ 07:24)  INR: 1.31 ratio <H> [0.65 - 1.30] (11-03 @ 07:24)  Platelet Count - Automated: 385 K/uL [130 - 400] (11-02 @ 15:24)  WBC Count: 11.81 K/uL <H> [4.80 - 10.80] (11-02 @ 15:24)  Culture Results:   No growth to date. (11-02 @ 15:24)  Culture Results:   No growth to date. (11-02 @ 15:24)        PT/INR - ( 03 Nov 2020 07:24 )   PT: 15.10 sec;   INR: 1.31 ratio         PTT - ( 03 Nov 2020 07:24 )  PTT:33.6 sec  EOE:  TMJ (  - ) clicks                     ( -  ) pops                     ( -  ) crepitus                       (  + ) trismus             ( +  ) lymphadenopathy             ( +  ) swelling             (  + ) asymmetry             ( +  ) palpation             (  - ) dyspnea             ( -  ) dysphagia             (  - ) loss of consciousness    IOE:            ( +  ) percussion           ( +  ) palpation           ( +  ) swelling            (  - ) abscess           ( -  ) sinus tract          *DENTAL RADIOGRAPHS: panoramic radiograph taken      *ASSESSMENT: #18 grossly decayed with periapical pathology, nonrestorable.          *PLAN: extraction #18    PROCEDURE:    ID: [id number]. Consent obtained. Side site completed. Risks and benefits explained to patient as per OS sheet dated 07/13/00.  Anesthesia: 1 carpule of 2% Lidocaine 1:100K epinephrine, 1 carpule 3% Carbocaine no epinephrine via inferior alveolar nerve block and 2 carpules of 4% Septocaine 1:100K epinephrine via infiltration.   Treatment: #18 elevated and removed with elevators and forceps. Post-operative x-ray taken. Hemostasis achieved. Post-operative instructions given to the patient.       RECOMMENDATIONS:  1) Amoxicillin 500mg 1q8 for 7 days, Ibuprofen 600mg as needed.  2) Dental F/U with outpatient dentist for comprehensive dental care.   3) If any difficulty swallowing/breathing, fever occur, return to ER.     Opal Meza DDS pager #[phone]

## 2020-11-04 NOTE — SBIRT NOTE ADULT - NSSBIRTBRIEFINTDET_GEN_A_CORE
Facilitated patient-centered discussion in motivational interviewing style to raise patient's awareness of harms around excessive alcohol use, NIAAA low-risk drinking level, affirm strengths, elicit change talk, and enhance motivation towards behavioral change.

## 2020-11-04 NOTE — PROGRESS NOTE ADULT - ASSESSMENT
40 year old male with no significant past medical history presents with left sided facial pain secondary to dental abscess    # Left sided Facial pain secondary to dental Abscess  - wbc 13, currently afebrile, 100.6 last night  - CT shows a 3x2 cm collection  -Dental consult - tooth removed 11/4, will reeval pt in the morning for possible  I&D if pt does not improve   -oral abx for now Amoxicillin 500mg 1q8 for 7 day  - Percocet for severe pain  - Mechanical Soft Diet for now    # Alcohol Use  - Counselled on reducing the consumption of alcohol    # Active Smoker  - Refused Nicotine patch  - Counselled on cessation    # Macrocytic Anemia likely alcohol induced  - Will supplement b12 and folate        DVT: Lovenox  GI: Pantoprazole  Activity: Ambulate as tolerated  Dispo: Anticipate for tomorrow if stable.

## 2020-11-05 VITALS
TEMPERATURE: 99 F | SYSTOLIC BLOOD PRESSURE: 139 MMHG | HEART RATE: 65 BPM | RESPIRATION RATE: 18 BRPM | DIASTOLIC BLOOD PRESSURE: 88 MMHG

## 2020-11-05 LAB
ANION GAP SERPL CALC-SCNC: 10 MMOL/L — SIGNIFICANT CHANGE UP (ref 7–14)
BUN SERPL-MCNC: 11 MG/DL — SIGNIFICANT CHANGE UP (ref 10–20)
CALCIUM SERPL-MCNC: 9.4 MG/DL — SIGNIFICANT CHANGE UP (ref 8.5–10.1)
CHLORIDE SERPL-SCNC: 103 MMOL/L — SIGNIFICANT CHANGE UP (ref 98–110)
CO2 SERPL-SCNC: 27 MMOL/L — SIGNIFICANT CHANGE UP (ref 17–32)
CREAT SERPL-MCNC: 1.1 MG/DL — SIGNIFICANT CHANGE UP (ref 0.7–1.5)
GLUCOSE SERPL-MCNC: 92 MG/DL — SIGNIFICANT CHANGE UP (ref 70–99)
HCT VFR BLD CALC: 41.9 % — LOW (ref 42–52)
HGB BLD-MCNC: 13.7 G/DL — LOW (ref 14–18)
MCHC RBC-ENTMCNC: 31 PG — SIGNIFICANT CHANGE UP (ref 27–31)
MCHC RBC-ENTMCNC: 32.7 G/DL — SIGNIFICANT CHANGE UP (ref 32–37)
MCV RBC AUTO: 94.8 FL — HIGH (ref 80–94)
NRBC # BLD: 0 /100 WBCS — SIGNIFICANT CHANGE UP (ref 0–0)
PLATELET # BLD AUTO: 444 K/UL — HIGH (ref 130–400)
POTASSIUM SERPL-MCNC: 4.7 MMOL/L — SIGNIFICANT CHANGE UP (ref 3.5–5)
POTASSIUM SERPL-SCNC: 4.7 MMOL/L — SIGNIFICANT CHANGE UP (ref 3.5–5)
RBC # BLD: 4.42 M/UL — LOW (ref 4.7–6.1)
RBC # FLD: 11.9 % — SIGNIFICANT CHANGE UP (ref 11.5–14.5)
SODIUM SERPL-SCNC: 140 MMOL/L — SIGNIFICANT CHANGE UP (ref 135–146)
WBC # BLD: 6.24 K/UL — SIGNIFICANT CHANGE UP (ref 4.8–10.8)
WBC # FLD AUTO: 6.24 K/UL — SIGNIFICANT CHANGE UP (ref 4.8–10.8)

## 2020-11-05 PROCEDURE — 99239 HOSP IP/OBS DSCHRG MGMT >30: CPT

## 2020-11-05 RX ORDER — IBUPROFEN 200 MG
2 TABLET ORAL
Qty: 40 | Refills: 0
Start: 2020-11-05 | End: 2020-11-14

## 2020-11-05 RX ORDER — AMOXICILLIN 250 MG/5ML
1 SUSPENSION, RECONSTITUTED, ORAL (ML) ORAL
Qty: 21 | Refills: 0
Start: 2020-11-05 | End: 2020-11-11

## 2020-11-05 RX ORDER — AMOXICILLIN 250 MG/5ML
500 SUSPENSION, RECONSTITUTED, ORAL (ML) ORAL EVERY 8 HOURS
Refills: 0 | Status: DISCONTINUED | OUTPATIENT
Start: 2020-11-05 | End: 2020-11-05

## 2020-11-05 RX ADMIN — PANTOPRAZOLE SODIUM 40 MILLIGRAM(S): 20 TABLET, DELAYED RELEASE ORAL at 05:25

## 2020-11-05 RX ADMIN — Medication 500 MILLIGRAM(S): at 11:07

## 2020-11-05 RX ADMIN — AMPICILLIN SODIUM AND SULBACTAM SODIUM 200 GRAM(S): 250; 125 INJECTION, POWDER, FOR SUSPENSION INTRAMUSCULAR; INTRAVENOUS at 05:25

## 2020-11-05 RX ADMIN — PREGABALIN 1000 MICROGRAM(S): 225 CAPSULE ORAL at 11:07

## 2020-11-05 RX ADMIN — Medication 1 MILLIGRAM(S): at 11:07

## 2020-11-05 NOTE — DISCHARGE NOTE PROVIDER - NSDCMRMEDTOKEN_GEN_ALL_CORE_FT
amoxicillin 500 mg oral capsule: 1 cap(s) orally every 8 hours END DATE : 11/12/2020  ibuprofen 400 mg oral tablet: 2 tab(s) orally 2 times a day as needed

## 2020-11-05 NOTE — PROGRESS NOTE ADULT - SUBJECTIVE AND OBJECTIVE BOX
Patient visited bedside morning of 11/5/20. Patient reports that he is feeling better. Patient was alert and oriented. Swelling of the left mandible still present.    Cj Schwartz DDS  Pager #[phone] Patient visited bedside morning of 11/5/20. Patient reports that he is feeling better. Patient was alert and oriented. Swelling of the left mandible still present (as expected) after surgical intervention.    Cj Schwartz DDS  Pager #[phone]

## 2020-11-05 NOTE — DISCHARGE NOTE PROVIDER - NSDCCPCAREPLAN_GEN_ALL_CORE_FT
PRINCIPAL DISCHARGE DIAGNOSIS  Diagnosis: Dental infection  Assessment and Plan of Treatment: You were see by the dental team at the hospital, the affected tooth was removed. There was an abscess seen on CT maxilofacial, per Dental, this should resolve with the tooth removal, there is no need for drainage of the abscess. Please complete your antibiotics regimen as prescribed and follow up with Dental asw an outpt. If there is worsening of symptoms, please contact your PMD or go to the nearest ED.       PRINCIPAL DISCHARGE DIAGNOSIS  Diagnosis: Dental infection  Assessment and Plan of Treatment: You were seen by the dental team at the hospital, the affected tooth was removed. There was an abscess seen on CT maxilofacial, per Dental, this should resolve with the tooth removal, there is no need for drainage of the abscess. Please complete your antibiotics regimen as prescribed and follow up with Dental asw an outpt. If there is worsening of symptoms, please contact your PMD or go to the nearest ED. Please follow up with your dentist as an outpatient.

## 2020-11-05 NOTE — DISCHARGE NOTE PROVIDER - HOSPITAL COURSE
40 year old male with no significant past medical history presents with left sided facial pain.    Patient reports that he was in usual state of health 2 weeks ago when he developed left sided dental pain sharp after a bone injured his teeth and gum while eating. Patient had occasional intermittent bleeding and left sided dental pain. Patient reports associated subjective fevers and chills. He saw his dentist and was prescribed clindamycin one week ago with little initial improvement but worsened over time again. He reports sharp 7/10 pain in the left jaw with radiation to left side of face worse with movement of jaw including eating, talking or opening mouth, slightly improved with rest. Patient presented to ED due to non improvement in symptoms despite treatment.    In ED patient hemodynamically stable, afebrile, elevated white count of 11.81, CT scan showing 3.4x2.2cm collection in the maxillofacial area.  Dental consulted pt, removed affected tooth 11/04/2020. Pt reports that he is feeling better today. Hemodynamically stable to be discharged.    Discharge plans discussed with attending.    40 year old male with no significant past medical history presents with left sided facial pain.    Patient reports that he was in usual state of health 2 weeks ago when he developed left sided dental pain sharp after a bone injured his teeth and gum while eating. Patient had occasional intermittent bleeding and left sided dental pain. Patient reports associated subjective fevers and chills. He saw his dentist and was prescribed clindamycin one week ago with little initial improvement but worsened over time again. He reports sharp 7/10 pain in the left jaw with radiation to left side of face worse with movement of jaw including eating, talking or opening mouth, slightly improved with rest. Patient presented to ED due to non improvement in symptoms despite treatment.    In ED patient hemodynamically stable, afebrile, elevated white count of 11.81, CT scan showing 3.4x2.2cm collection in the maxillofacial area.  Dental consulted pt, removed affected tooth 11/04/2020. Per dental, no need for an incision and drainage. Pt reports that he is feeling better today. Hemodynamically stable to be discharged. Discharged on Amoxicillin 500mg q8h for 7 days and Ibuprofen as needed. Pt to follow up with his dentist as an outpatient.     Pt advised on smoking and alcohol cessation.    Discharge plans discussed with attending.

## 2020-11-05 NOTE — DISCHARGE NOTE NURSING/CASE MANAGEMENT/SOCIAL WORK - PATIENT PORTAL LINK FT
You can access the FollowMyHealth Patient Portal offered by [facility] by registering at the following website: [email or web address]. By joining [employer]’s FollowMyHealth portal, you will also be able to view your health information using other applications (apps) compatible with our system.

## 2020-11-07 LAB
CULTURE RESULTS: SIGNIFICANT CHANGE UP
CULTURE RESULTS: SIGNIFICANT CHANGE UP
SPECIMEN SOURCE: SIGNIFICANT CHANGE UP
SPECIMEN SOURCE: SIGNIFICANT CHANGE UP

## 2020-11-11 DIAGNOSIS — Z71.41 ALCOHOL ABUSE COUNSELING AND SURVEILLANCE OF ALCOHOLIC: ICD-10-CM

## 2020-11-11 DIAGNOSIS — E78.5 HYPERLIPIDEMIA, UNSPECIFIED: ICD-10-CM

## 2020-11-11 DIAGNOSIS — Z71.6 TOBACCO ABUSE COUNSELING: ICD-10-CM

## 2020-11-11 DIAGNOSIS — R94.5 ABNORMAL RESULTS OF LIVER FUNCTION STUDIES: ICD-10-CM

## 2020-11-11 DIAGNOSIS — F17.210 NICOTINE DEPENDENCE, CIGARETTES, UNCOMPLICATED: ICD-10-CM

## 2020-11-11 DIAGNOSIS — K02.9 DENTAL CARIES, UNSPECIFIED: ICD-10-CM

## 2020-11-11 DIAGNOSIS — Z72.89 OTHER PROBLEMS RELATED TO LIFESTYLE: ICD-10-CM

## 2020-11-11 DIAGNOSIS — M27.2 INFLAMMATORY CONDITIONS OF JAWS: ICD-10-CM

## 2020-11-11 DIAGNOSIS — D53.9 NUTRITIONAL ANEMIA, UNSPECIFIED: ICD-10-CM

## 2020-11-11 DIAGNOSIS — K04.7 PERIAPICAL ABSCESS WITHOUT SINUS: ICD-10-CM

## 2021-07-31 ENCOUNTER — EMERGENCY (EMERGENCY)
Facility: HOSPITAL | Age: 41
LOS: 0 days | Discharge: HOME | End: 2021-07-31
Attending: EMERGENCY MEDICINE | Admitting: EMERGENCY MEDICINE
Payer: MEDICAID

## 2021-07-31 VITALS
DIASTOLIC BLOOD PRESSURE: 83 MMHG | OXYGEN SATURATION: 98 % | RESPIRATION RATE: 20 BRPM | TEMPERATURE: 98 F | SYSTOLIC BLOOD PRESSURE: 127 MMHG | HEART RATE: 76 BPM

## 2021-07-31 VITALS — DIASTOLIC BLOOD PRESSURE: 87 MMHG | HEART RATE: 80 BPM | SYSTOLIC BLOOD PRESSURE: 122 MMHG

## 2021-07-31 DIAGNOSIS — F17.200 NICOTINE DEPENDENCE, UNSPECIFIED, UNCOMPLICATED: ICD-10-CM

## 2021-07-31 DIAGNOSIS — Z79.899 OTHER LONG TERM (CURRENT) DRUG THERAPY: ICD-10-CM

## 2021-07-31 DIAGNOSIS — Z91.048 OTHER NONMEDICINAL SUBSTANCE ALLERGY STATUS: ICD-10-CM

## 2021-07-31 DIAGNOSIS — S71.001A UNSPECIFIED OPEN WOUND, RIGHT HIP, INITIAL ENCOUNTER: ICD-10-CM

## 2021-07-31 DIAGNOSIS — Z48.00 ENCOUNTER FOR CHANGE OR REMOVAL OF NONSURGICAL WOUND DRESSING: ICD-10-CM
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
## 2021-07-31 DIAGNOSIS — L53.9 ERYTHEMATOUS CONDITION, UNSPECIFIED: ICD-10-CM

## 2021-07-31 DIAGNOSIS — M79.89 OTHER SPECIFIED SOFT TISSUE DISORDERS: ICD-10-CM

## 2021-07-31 DIAGNOSIS — Z48.817 ENCOUNTER FOR SURGICAL AFTERCARE FOLLOWING SURGERY ON THE SKIN AND SUBCUTANEOUS TISSUE: ICD-10-CM

## 2021-07-31 LAB
ALBUMIN SERPL ELPH-MCNC: 4.5 G/DL — SIGNIFICANT CHANGE UP (ref 3.5–5.2)
ALP SERPL-CCNC: 129 U/L — HIGH (ref 30–115)
ALT FLD-CCNC: 26 U/L — SIGNIFICANT CHANGE UP (ref 0–41)
ANION GAP SERPL CALC-SCNC: 14 MMOL/L — SIGNIFICANT CHANGE UP (ref 7–14)
AST SERPL-CCNC: 24 U/L — SIGNIFICANT CHANGE UP (ref 0–41)
BASOPHILS # BLD AUTO: 0.04 K/UL — SIGNIFICANT CHANGE UP (ref 0–0.2)
BASOPHILS NFR BLD AUTO: 0.6 % — SIGNIFICANT CHANGE UP (ref 0–1)
BILIRUB SERPL-MCNC: 0.3 MG/DL — SIGNIFICANT CHANGE UP (ref 0.2–1.2)
BUN SERPL-MCNC: 17 MG/DL — SIGNIFICANT CHANGE UP (ref 10–20)
CALCIUM SERPL-MCNC: 9.9 MG/DL — SIGNIFICANT CHANGE UP (ref 8.5–10.1)
CHLORIDE SERPL-SCNC: 101 MMOL/L — SIGNIFICANT CHANGE UP (ref 98–110)
CO2 SERPL-SCNC: 23 MMOL/L — SIGNIFICANT CHANGE UP (ref 17–32)
CREAT SERPL-MCNC: 0.9 MG/DL — SIGNIFICANT CHANGE UP (ref 0.7–1.5)
EOSINOPHIL # BLD AUTO: 0.28 K/UL — SIGNIFICANT CHANGE UP (ref 0–0.7)
EOSINOPHIL NFR BLD AUTO: 3.9 % — SIGNIFICANT CHANGE UP (ref 0–8)
ERYTHROCYTE [SEDIMENTATION RATE] IN BLOOD: 43 MM/HR — HIGH (ref 0–10)
GLUCOSE SERPL-MCNC: 75 MG/DL — SIGNIFICANT CHANGE UP (ref 70–99)
HCT VFR BLD CALC: 44.8 % — SIGNIFICANT CHANGE UP (ref 42–52)
HGB BLD-MCNC: 15 G/DL — SIGNIFICANT CHANGE UP (ref 14–18)
IMM GRANULOCYTES NFR BLD AUTO: 0.6 % — HIGH (ref 0.1–0.3)
LACTATE SERPL-SCNC: 1.4 MMOL/L — SIGNIFICANT CHANGE UP (ref 0.7–2)
LYMPHOCYTES # BLD AUTO: 2.71 K/UL — SIGNIFICANT CHANGE UP (ref 1.2–3.4)
LYMPHOCYTES # BLD AUTO: 37.9 % — SIGNIFICANT CHANGE UP (ref 20.5–51.1)
MCHC RBC-ENTMCNC: 31.6 PG — HIGH (ref 27–31)
MCHC RBC-ENTMCNC: 33.5 G/DL — SIGNIFICANT CHANGE UP (ref 32–37)
MCV RBC AUTO: 94.3 FL — HIGH (ref 80–94)
MONOCYTES # BLD AUTO: 0.79 K/UL — HIGH (ref 0.1–0.6)
MONOCYTES NFR BLD AUTO: 11 % — HIGH (ref 1.7–9.3)
NEUTROPHILS # BLD AUTO: 3.29 K/UL — SIGNIFICANT CHANGE UP (ref 1.4–6.5)
NEUTROPHILS NFR BLD AUTO: 46 % — SIGNIFICANT CHANGE UP (ref 42.2–75.2)
NRBC # BLD: 0 /100 WBCS — SIGNIFICANT CHANGE UP (ref 0–0)
PLATELET # BLD AUTO: 355 K/UL — SIGNIFICANT CHANGE UP (ref 130–400)
POTASSIUM SERPL-MCNC: 4.3 MMOL/L — SIGNIFICANT CHANGE UP (ref 3.5–5)
POTASSIUM SERPL-SCNC: 4.3 MMOL/L — SIGNIFICANT CHANGE UP (ref 3.5–5)
PROT SERPL-MCNC: 7.4 G/DL — SIGNIFICANT CHANGE UP (ref 6–8)
RBC # BLD: 4.75 M/UL — SIGNIFICANT CHANGE UP (ref 4.7–6.1)
RBC # FLD: 12.2 % — SIGNIFICANT CHANGE UP (ref 11.5–14.5)
SARS-COV-2 RNA SPEC QL NAA+PROBE: SIGNIFICANT CHANGE UP
SODIUM SERPL-SCNC: 138 MMOL/L — SIGNIFICANT CHANGE UP (ref 135–146)
WBC # BLD: 7.15 K/UL — SIGNIFICANT CHANGE UP (ref 4.8–10.8)
WBC # FLD AUTO: 7.15 K/UL — SIGNIFICANT CHANGE UP (ref 4.8–10.8)

## 2021-07-31 PROCEDURE — 99284 EMERGENCY DEPT VISIT MOD MDM: CPT

## 2021-07-31 PROCEDURE — 73502 X-RAY EXAM HIP UNI 2-3 VIEWS: CPT | Mod: 26,RT

## 2021-07-31 PROCEDURE — 73552 X-RAY EXAM OF FEMUR 2/>: CPT | Mod: 26,RT

## 2021-07-31 PROCEDURE — 99282 EMERGENCY DEPT VISIT SF MDM: CPT

## 2021-07-31 RX ORDER — CEPHALEXIN 500 MG
1 CAPSULE ORAL
Qty: 28 | Refills: 0
Start: 2021-07-31 | End: 2021-08-08

## 2021-07-31 RX ORDER — CEPHALEXIN 500 MG
1 CAPSULE ORAL
Qty: 28 | Refills: 0
Start: 2021-07-31 | End: 2021-08-06

## 2021-07-31 RX ORDER — VANCOMYCIN HCL 1 G
1000 VIAL (EA) INTRAVENOUS ONCE
Refills: 0 | Status: COMPLETED | OUTPATIENT
Start: 2021-07-31 | End: 2021-07-31

## 2021-07-31 RX ORDER — AZTREONAM 2 G
1 VIAL (EA) INJECTION
Qty: 14 | Refills: 0
Start: 2021-07-31 | End: 2021-08-08

## 2021-07-31 RX ORDER — AZTREONAM 2 G
1 VIAL (EA) INJECTION
Qty: 14 | Refills: 0
Start: 2021-07-31 | End: 2021-08-06

## 2021-07-31 RX ADMIN — Medication 250 MILLIGRAM(S): at 21:53

## 2021-07-31 NOTE — CONSULT NOTE ADULT - ASSESSMENT
40 y/o male w/ open wound to right hip and infected follicle to left lateral calf    Plan  Given no fevers, no white count, not immunocompromised, wound is clean, pain is controlled  - pt may dc with Burn clinic f/u outpatient, please call to make appointment  - wound culture taken of right hip f/u results  - PO abx rec  - wound care: soap & water bid, moist packing to right hip bid, dry dressing to left calf  - pt may shower as usual, NO baths, pools, or ocean until wounds heal  - return precautions given

## 2021-07-31 NOTE — ED PROVIDER NOTE - NSFOLLOWUPCLINICS_GEN_ALL_ED_FT
[facility]  Burn  [address], [address]  [place] [address]  Phone: [phone]  Fax:   Follow Up Time: 1-3 Days

## 2021-07-31 NOTE — ED PROVIDER NOTE - NSFOLLOWUPINSTRUCTIONS_ED_ALL_ED_FT
Wound Check  ImageIf you have a wound, it may take some time to heal. Eventually, a scar will form. The scar will also fade with time. It is important to take care of your wound while it is healing. This helps to protect your wound from infection.    How should I take care of my wound at home?  Some wounds are allowed to close on their own or are repaired at a later date. There are many different ways to close and cover a wound, including stitches (sutures), skin glue, and adhesive strips. Follow your health care provider's instructions about:    Wound care.  Bandage (dressing) changes and removal.  Wound closure removal.    Take medicines only as directed by your health care provider.  Keep all follow-up visits as directed by your health care provider. This is important.  Do not take baths, swim, or use a hot tub until your health care provider approves. You may shower as directed by your health care provider.  Keep your wound clean and dry.  What affects scar formation?  Scars affect each person differently. How your body scars depends on:    The location and size of your wound.  Traits that you inherited from your parents (genetic predisposition).  How you take care of your wound. Irritation and inflammation increase the amount of scar formation.  Sun exposure. This can darken a scar.    When should I call or see my health care provider?  Call or see your health care provider if:    You have redness, swelling, or pain at your wound site.  You have fluid, blood, or pus coming from your wound.  You have muscle aches, chills, or a general ill feeling.  You notice a bad smell coming from the wound.  Your wound separates after the sutures, staples, or skin adhesive strips have been removed.  You have persistent nausea or vomiting.  You have a fever.  You are dizzy.    When should I call 911 or go to the emergency room?  Call 911 or go to the emergency room if:    You faint.  You have difficulty breathing.    This information is not intended to replace advice given to you by your health care provider. Make sure you discuss any questions you have with your health care provider.    Abscess    WHAT YOU NEED TO KNOW:    A warm compress may help your abscess drain. Your healthcare provider may make a cut in the abscess so it can drain. You may need surgery to remove an abscess that is on your hands or buttocks.     DISCHARGE INSTRUCTIONS:    Return to the emergency department if:     The area around your abscess becomes very painful, warm, or has red streaks.      You have a fever and chills.      Your heart is beating faster than usual.       You feel faint or confused.    Contact your healthcare provider if:     Your abscess gets bigger or does not get better.      Your abscess returns.      You have questions or concerns about your condition or care.    Medicines: You may need any of the following:     Antibiotics help treat a bacterial infection.       Acetaminophen decreases pain and fever. It is available without a doctor's order. Ask how much to take and how often to take it. Follow directions. Acetaminophen can cause liver damage if not taken correctly.      NSAIDs, such as ibuprofen, help decrease swelling, pain, and fever. This medicine is available with or without a doctor's order. NSAIDs can cause stomach bleeding or kidney problems in certain people. If you take blood thinner medicine, always ask your healthcare provider if NSAIDs are safe for you. Always read the medicine label and follow directions.      Take your medicine as directed. Contact your healthcare provider if you think your medicine is not helping or if you have side effects. Tell him or her if you are allergic to any medicine. Keep a list of the medicines, vitamins, and herbs you take. Include the amounts, and when and why you take them. Bring the list or the pill bottles to follow-up visits. Carry your medicine list with you in case of an emergency.    Self-care:     Apply a warm compress to your abscess. This will help it open and drain. Wet a washcloth in warm, but not hot, water. Apply the compress for 10 minutes. Repeat this 4 times each day. Do not press on an abscess or try to open it with a needle. You may push the bacteria deeper or into your blood.       Do not share your clothes, towels, or sheets with anyone. This can spread the infection to others.       Wash your hands often. This can help prevent the spread of germs. Use soap and water or an alcohol-based hand rub.     Care for your wound after it is drained:     Care for your wound as directed. If your healthcare provider says it is okay, carefully remove the bandage and gauze packing. You may need to soak the gauze to get it out of your wound. Clean your wound and the area around it as directed. Dry the area and put on new, clean bandages. Change your bandages when they get wet or dirty.       Ask your healthcare provider how to change the gauze in your wound. Keep track of how many pieces of gauze are placed inside the wound. Do not put too much packing in the wound. Do not pack the gauze too tightly in your wound.     Follow up with your healthcare provider in 1 to 3 days: You may need to have your packing removed or your bandage changed. Write down your questions so you remember to ask them during your visits.        © Copyright [employer] 2019 All illustrations and images included in CareNotes are the copyrighted property of [employer] or [employer].

## 2021-07-31 NOTE — CONSULT NOTE ADULT - SUBJECTIVE AND OBJECTIVE BOX
42 y/o male denies any pmhx presents to ED w/ right hip wound x 1 week and left lateral calf red bump x 5 days. Patient states his right hip began as a "small red bump" which became larger and ruptured 2 days ago with yellow pus. Patient denies any bites, pruritus, trauma, fever, cough, chills, cp, sob, abd pain, diff ambulating, sick contacts, recent travel.    Burn consulted for wound evaluation.    PAST MEDICAL & SURGICAL HISTORY:  No pertinent past medical history  No significant past surgical history      Allergies  Grass, Dogs, Animals, Pollen (Pruritus)  No Known Drug Allergies    Intolerances      FAMILY HISTORY:  No pertinent family history in first degree relatives    Vital Signs Last 24 Hrs  T(C): 36.9 (31 Jul 2021 20:33), Max: 36.9 (31 Jul 2021 20:33)  T(F): 98.5 (31 Jul 2021 20:33), Max: 98.5 (31 Jul 2021 20:33)  HR: 76 (31 Jul 2021 20:33) (76 - 76)  BP: 127/83 (31 Jul 2021 20:33) (127/83 - 127/83)  BP(mean): --  RR: 20 (31 Jul 2021 20:33) (20 - 20)  SpO2: 98% (31 Jul 2021 20:33) (98% - 98%)        I&O's Detail        LABS:                        15.0   7.15  )-----------( 355      ( 31 Jul 2021 21:18 )             44.8     07-31    138  |  101  |  17  ----------------------------<  75  4.3   |  23  |  0.9    Ca    9.9      31 Jul 2021 21:18    TPro  7.4  /  Alb  4.5  /  TBili  0.3  /  DBili  x   /  AST  24  /  ALT  26  /  AlkPhos  129<H>  07-31        PE:  Gen: NAD, sitting in chair, daughter at side  Skin: Right hip with open wound ~ 2x2.5x1cm w/ mild surrounding erythema, pink wound base w/ small amount of white exudate, no purulence noted, no tunneling, no undermining, no active bleeding, +ttp, _+FROM, pt able to ambulate well  Left lateral calf w/ ~3x2cm area of mild erythema w/ small dry scab at center within hair follicle, no ttp, no calf pain, +FROM    Wound culture taken of right hip. Wound cleaned and dressed with moist packing.  Purulence expressed from left lateral calf, wound cleaned, and dressed.

## 2021-07-31 NOTE — ED PROVIDER NOTE - PHYSICAL EXAMINATION
Physical Exam    Vital Signs: I have reviewed the initial vital signs.  Constitutional: well-nourished, appears stated age, no acute distress  Eyes: Conjunctiva pink, Sclera clear  Cardiovascular: S1 and S2, regular rate, regular rhythm, well-perfused extremities, radial pulses equal and 2+ b/l.   Respiratory: unlabored respiratory effort, clear to auscultation bilaterally no wheezing, rales and rhonchi. pt is speaking full sentences. no accessory muscle use.   Gastrointestinal: soft, non-tender, nondistended abdomen, no pulsatile mass, normal bowl sounds, no rebound, no guarding, no organomegaly.   Musculoskeletal: supple neck, no lower extremity edema, no calf tenderness, FROM of b/l upper and lower extremities without pain.   Integumentary: warm, dry, no rash. about 2cm round wound to the right hip with mild surrounding erythema and tenderness, pink wound base with small amount of white exudate, no active drainage. about 1cm area of mild erythema with small dry scab at center without active drainage, area of fluctuance or tenderness.   Neurologic: awake, alert, cranial nerves II-XII grossly intact, extremities’ motor and sensory functions grossly intact. finger to nose intact. steady gait.   Psychiatric: appropriate mood, appropriate affect

## 2021-07-31 NOTE — ED PROVIDER NOTE - OBJECTIVE STATEMENT
42 y/o male with no significant PMH presents to ED for evaluation of  right hip wound x 1 week and left lateral calf red bump x 5 days. pt reports 1 week ago he noticed a "small red bump" on the right hip which got bigger and then popped on its own and began to drain. pt reports he was using an at home remedy which consisted of alcohol to care for it but noticed it was getting worse and has continued to drain yellow pus. Patient denies any bites, pruritus, trauma, fever, cough, chills, chest, sob, abd pain, difficulty ambulating, recent trauma, weakness, numbness, tingling, sick contacts, or recent travel.

## 2021-07-31 NOTE — ED ADULT NURSE NOTE - OBJECTIVE STATEMENT
A&Ox4   family member at bedside  presents to ED with wound to hip x 1 week  denies pmhx   no s/s of distress  awaiting further MD eval and disposition

## 2021-07-31 NOTE — ED PROVIDER NOTE - WR ORDER NAME 2
Xray Hip 2-3 Views, Right I personally performed the service described in the documentation recorded by the scribe in my presence, and it accurately and completely records my words and actions.

## 2021-07-31 NOTE — ED PROVIDER NOTE - PATIENT PORTAL LINK FT
You can access the FollowMyHealth Patient Portal offered by [facility] by registering at the following website: [email or web address]. By joining [employer]’s FollowMyHealth portal, you will also be able to view your health information using other applications (apps) compatible with our system.

## 2021-07-31 NOTE — ED PROVIDER NOTE - NS ED ROS FT
CONST: No fever, chills or bodyaches  EYES: No pain, redness, drainage or visual changes.  ENT: No ear pain or discharge, nasal discharge or congestion. No sore throat  CARD: No chest pain, palpitations  RESP: No SOB, cough, hemoptysis. No hx of asthma or COPD  GI: No abdominal pain, N/V/D  : No urinary symptoms  MS: No joint pain, back pain or extremity pain/injury  SKIN: No rashes. (+)  right hip wound check and left lateral shin wound check   NEURO: No headache, dizziness, paresthesias or LOC

## 2021-07-31 NOTE — ED PROVIDER NOTE - ATTENDING CONTRIBUTION TO CARE
42 yo M presents to Ed for R hip wound. Pt states it started out as a small pustule and then opened and started draining about 1 week ago. Since then he has noticed some erythema and pain. He also has a small erythematous area to his L calf which is how he states his hip wound started. No fevers or chills. No SOB, CP, palpitations.     Const: Well nourished, well developed, appears stated age  Eyes: PERRL, no conjunctival injection  HENT:  Neck supple without meningismus   CV: RRR, Warm, well-perfused extremities  RESP: CTA B/L, no tachypnea   GI: soft, non-tender, non-distended  MSK: No gross deformities appreciated  Skin: Warm, dry. No rashes. R hip: open wound with pus, no crepitus.  Small erythematous swelling of L calf.   Neuro: Alert, CNs II-XII grossly intact. Sensation and motor function of extremities grossly intact.  Psych: Appropriate mood and affect.    labs, xray

## 2021-07-31 NOTE — ED PROVIDER NOTE - PROGRESS NOTE DETAILS
FF: spoke with burn will consult FF: pt seen by burn, pt can be dc home on abx. rx abx. advised of return precautions discussed at bedside. f/u with burn. agreeable to dc.
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
## 2021-08-01 PROBLEM — Z78.9 OTHER SPECIFIED HEALTH STATUS: Chronic | Status: ACTIVE | Noted: 2020-11-02

## 2021-08-01 LAB — CRP SERPL-MCNC: 22 MG/L — HIGH

## 2021-08-03 LAB
-  AMPICILLIN/SULBACTAM: SIGNIFICANT CHANGE UP
-  CEFAZOLIN: SIGNIFICANT CHANGE UP
-  CLINDAMYCIN: SIGNIFICANT CHANGE UP
-  DAPTOMYCIN: SIGNIFICANT CHANGE UP
-  ERYTHROMYCIN: SIGNIFICANT CHANGE UP
-  GENTAMICIN: SIGNIFICANT CHANGE UP
-  LINEZOLID: SIGNIFICANT CHANGE UP
-  OXACILLIN: SIGNIFICANT CHANGE UP
-  PENICILLIN: SIGNIFICANT CHANGE UP
-  RIFAMPIN: SIGNIFICANT CHANGE UP
-  TETRACYCLINE: SIGNIFICANT CHANGE UP
-  TRIMETHOPRIM/SULFAMETHOXAZOLE: SIGNIFICANT CHANGE UP
-  VANCOMYCIN: SIGNIFICANT CHANGE UP
CULTURE RESULTS: SIGNIFICANT CHANGE UP
METHOD TYPE: SIGNIFICANT CHANGE UP
ORGANISM # SPEC MICROSCOPIC CNT: SIGNIFICANT CHANGE UP
ORGANISM # SPEC MICROSCOPIC CNT: SIGNIFICANT CHANGE UP
SPECIMEN SOURCE: SIGNIFICANT CHANGE UP

## 2023-10-24 NOTE — ED PROVIDER NOTE - NS_EDPROVIDERDISPOUSERTYPE_ED_A_ED
The patient's goals for the shift include no falls    The clinical goals for the shift include no falls      Problem: Pain - Adult  Goal: Verbalizes/displays adequate comfort level or baseline comfort level  Outcome: Progressing     Problem: Safety - Adult  Goal: Free from fall injury  Outcome: Progressing     Problem: Discharge Planning  Goal: Discharge to home or other facility with appropriate resources  Outcome: Progressing     Problem: Chronic Conditions and Co-morbidities  Goal: Patient's chronic conditions and co-morbidity symptoms are monitored and maintained or improved  Outcome: Progressing     Problem: Skin  Goal: Participates in plan/prevention/treatment measures  Outcome: Progressing  Goal: Prevent/manage excess moisture  Outcome: Progressing  Goal: Prevent/minimize sheer/friction injuries  Outcome: Progressing  Goal: Promote/optimize nutrition  Outcome: Progressing     Problem: Respiratory  Goal: Clear secretions with interventions this shift  Outcome: Progressing  Goal: Minimal/no exertional discomfort or dyspnea this shift  Outcome: Progressing  Goal: No signs of respiratory distress (eg. Use of accessory muscles. Peds grunting)  Outcome: Progressing  Goal: Verbalize decreased shortness of breath this shift  Outcome: Progressing  Goal: Wean oxygen to maintain O2 saturation per order/standard this shift  Outcome: Progressing      Attending Attestation (For Attendings USE Only)...